# Patient Record
Sex: MALE | Race: WHITE | NOT HISPANIC OR LATINO | Employment: UNEMPLOYED | URBAN - METROPOLITAN AREA
[De-identification: names, ages, dates, MRNs, and addresses within clinical notes are randomized per-mention and may not be internally consistent; named-entity substitution may affect disease eponyms.]

---

## 2024-06-27 ENCOUNTER — HOSPITAL ENCOUNTER (EMERGENCY)
Facility: HOSPITAL | Age: 39
Discharge: HOME | End: 2024-06-27
Attending: STUDENT IN AN ORGANIZED HEALTH CARE EDUCATION/TRAINING PROGRAM
Payer: MEDICAID

## 2024-06-27 VITALS
HEART RATE: 87 BPM | RESPIRATION RATE: 16 BRPM | WEIGHT: 153 LBS | OXYGEN SATURATION: 95 % | DIASTOLIC BLOOD PRESSURE: 78 MMHG | HEIGHT: 67 IN | TEMPERATURE: 98.4 F | SYSTOLIC BLOOD PRESSURE: 153 MMHG | BODY MASS INDEX: 24.01 KG/M2

## 2024-06-27 DIAGNOSIS — T15.92XA FOREIGN BODY OF LEFT EYE, INITIAL ENCOUNTER: Primary | ICD-10-CM

## 2024-06-27 PROCEDURE — 99283 EMERGENCY DEPT VISIT LOW MDM: CPT | Mod: 25

## 2024-06-27 PROCEDURE — 2500000005 HC RX 250 GENERAL PHARMACY W/O HCPCS: Performed by: STUDENT IN AN ORGANIZED HEALTH CARE EDUCATION/TRAINING PROGRAM

## 2024-06-27 PROCEDURE — 2500000001 HC RX 250 WO HCPCS SELF ADMINISTERED DRUGS (ALT 637 FOR MEDICARE OP): Performed by: STUDENT IN AN ORGANIZED HEALTH CARE EDUCATION/TRAINING PROGRAM

## 2024-06-27 PROCEDURE — 65222 REMOVE FOREIGN BODY FROM EYE: CPT | Mod: LT | Performed by: STUDENT IN AN ORGANIZED HEALTH CARE EDUCATION/TRAINING PROGRAM

## 2024-06-27 RX ORDER — TETRACAINE HYDROCHLORIDE 5 MG/ML
1 SOLUTION OPHTHALMIC ONCE
Status: COMPLETED | OUTPATIENT
Start: 2024-06-27 | End: 2024-06-27

## 2024-06-27 RX ORDER — ERYTHROMYCIN 5 MG/G
OINTMENT OPHTHALMIC 4 TIMES DAILY
Qty: 3.5 G | Refills: 0 | Status: SHIPPED | OUTPATIENT
Start: 2024-06-27 | End: 2024-07-07

## 2024-06-27 RX ADMIN — FLUORESCEIN SODIUM 1 STRIP: 1 STRIP OPHTHALMIC at 14:05

## 2024-06-27 RX ADMIN — TETRACAINE HYDROCHLORIDE 1 DROP: 5 SOLUTION/ DROPS OPHTHALMIC at 14:05

## 2024-06-27 ASSESSMENT — LIFESTYLE VARIABLES
TOTAL SCORE: 0
HAVE YOU EVER FELT YOU SHOULD CUT DOWN ON YOUR DRINKING: NO
EVER FELT BAD OR GUILTY ABOUT YOUR DRINKING: NO
HAVE PEOPLE ANNOYED YOU BY CRITICIZING YOUR DRINKING: NO
EVER HAD A DRINK FIRST THING IN THE MORNING TO STEADY YOUR NERVES TO GET RID OF A HANGOVER: NO

## 2024-06-27 ASSESSMENT — COLUMBIA-SUICIDE SEVERITY RATING SCALE - C-SSRS
6. HAVE YOU EVER DONE ANYTHING, STARTED TO DO ANYTHING, OR PREPARED TO DO ANYTHING TO END YOUR LIFE?: NO
2. HAVE YOU ACTUALLY HAD ANY THOUGHTS OF KILLING YOURSELF?: NO
1. IN THE PAST MONTH, HAVE YOU WISHED YOU WERE DEAD OR WISHED YOU COULD GO TO SLEEP AND NOT WAKE UP?: NO

## 2024-06-27 ASSESSMENT — PAIN - FUNCTIONAL ASSESSMENT: PAIN_FUNCTIONAL_ASSESSMENT: 0-10

## 2024-06-27 ASSESSMENT — PAIN SCALES - GENERAL: PAINLEVEL_OUTOF10: 7

## 2024-06-27 ASSESSMENT — PAIN DESCRIPTION - PAIN TYPE: TYPE: ACUTE PAIN

## 2024-06-27 ASSESSMENT — PAIN DESCRIPTION - LOCATION: LOCATION: EYE

## 2024-06-27 NOTE — ED PROVIDER NOTES
HPI   Chief Complaint   Patient presents with    Foreign Body in Eye       This is a 39-year-old male with no pertinent past medical history presenting the emergency department with concern for foreign body to his left eye.  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  He tried to flush it out without improvement.  Today he noticed the eye was red and the pain was worse which prompted him to go to urgent care.  They told him he has a foreign body and that he should come to the emergency department to get it removed.  Patient wears glasses but not contacts.  Endorses occasional blurry vision during the symptoms as well as watering of his eyes.  Denies any loss of vision/field cuts.  Denies headaches, chest pain, fever/chills, nausea/vomiting.        History provided by:  Patient   used: No                        Hnia Coma Scale Score: 15                     Patient History   Past Medical History:   Diagnosis Date    Other conditions influencing health status     Patient denies significant medical history     No past surgical history on file.  No family history on file.  Social History     Tobacco Use    Smoking status: Not on file    Smokeless tobacco: Not on file   Substance Use Topics    Alcohol use: Not on file    Drug use: Not on file       Physical Exam   ED Triage Vitals [06/27/24 1349]   Temperature Heart Rate Respirations BP   36.9 °C (98.4 °F) 87 16 153/78      Pulse Ox Temp src Heart Rate Source Patient Position   95 % -- -- --      BP Location FiO2 (%)     -- --       Physical Exam  GEN: well appearing, no acute distress  HEAD: atraumatic  EYES: EOMI, PEERL, visual acuity 20/30 OU corrected. OS with conjunctival injection, small metallic appearing foreign body at 1 o'clock position of iris, negative Indigo sign, small amount of fluorescein uptake   NEURO: no focal deficits, no facial asymmetry, moving all  extremities  PSYCH: AAOx3 answers questions appropriately  ED Course & MDM   Diagnoses as of 06/27/24 1706   Foreign body of left eye, initial encounter       Medical Decision Making  This is a 39-year-old male with no pertinent past medical history presenting the emergency department with concern for foreign body to his left eye.  Patient stable upon presentation to the emergency department, no acute distress and vitals are unremarkable.  On exam he does have some conjunctival injection to the left eye with an apparent foreign body to the upper lateral iris at the 1 o'clock position.  Foreign body was able to be removed though there is a residual rust ring.  Negative Lavonne sign after removal as well.  Patient will be discharged home on erythromycin drops.  He is to follow-up with ophthalmology in the next 2 to 3 days for reevaluation and rust ring removal.  Patient is amenable to this plan.  Return precautions discussed and patient discharged in stable condition.      Procedure  Procedure: eye foreign body removal  Performed by: Thor Felipe MD  Risks, benefits, alternatives were discussed - patient provided informed verbal consent to proceed.  Analgesia: topical tetracaine drops  Localization method: slit lamp  Location: iris 1:00 position  Removal mechanism: 25 gauge needle   used: no  Complexity: complex  Objects removed: 1  Post-procedural exam: no no foreign bodies remaining, negative lavonne sign  Patient well tolerated the procedure.  There were no immediate complications.     Thor Felipe MD  06/28/24 0892

## 2024-06-27 NOTE — ED TRIAGE NOTES
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

## 2024-06-27 NOTE — DISCHARGE INSTRUCTIONS
Please take the eyedrops as prescribed.  Follow-up with ophthalmology, either your eye doctor or at the number provided.  There is still a rust ring that will need to be evaluated and removed by ophthalmology.  Follow-up in 2 to 3 days. Treat your pain with ibuprofen and Tylenol.  If you have worsening uncontrollable pain, changes in your vision or any additional concerns please return to the emergency department immediately.